# Patient Record
Sex: FEMALE | Race: WHITE | Employment: OTHER | URBAN - METROPOLITAN AREA
[De-identification: names, ages, dates, MRNs, and addresses within clinical notes are randomized per-mention and may not be internally consistent; named-entity substitution may affect disease eponyms.]

---

## 2023-10-24 ENCOUNTER — HOSPITAL ENCOUNTER (OUTPATIENT)
Facility: HOSPITAL | Age: 78
Setting detail: OBSERVATION
Discharge: HOME | End: 2023-10-25
Attending: EMERGENCY MEDICINE | Admitting: HOSPITALIST
Payer: MEDICARE

## 2023-10-24 DIAGNOSIS — R11.10 INTRACTABLE VOMITING: Primary | ICD-10-CM

## 2023-10-24 DIAGNOSIS — E86.0 DEHYDRATION: ICD-10-CM

## 2023-10-24 PROBLEM — R73.9 HYPERGLYCEMIA: Status: ACTIVE | Noted: 2023-10-24

## 2023-10-24 PROBLEM — E87.0 HYPERNATREMIA: Status: ACTIVE | Noted: 2023-10-24

## 2023-10-24 PROBLEM — R79.89 AZOTEMIA: Status: ACTIVE | Noted: 2023-10-24

## 2023-10-24 LAB
ALBUMIN SERPL-MCNC: 3.8 G/DL (ref 3.4–5)
ALBUMIN/GLOB SERPL: 1.1 {RATIO} (ref 1–2)
ALP LIVER SERPL-CCNC: 70 U/L
ALT SERPL-CCNC: 19 U/L
ANION GAP SERPL CALC-SCNC: 7 MMOL/L (ref 0–18)
AST SERPL-CCNC: 14 U/L (ref 15–37)
BASOPHILS # BLD AUTO: 0.03 X10(3) UL (ref 0–0.2)
BASOPHILS NFR BLD AUTO: 0.4 %
BILIRUB SERPL-MCNC: 0.5 MG/DL (ref 0.1–2)
BILIRUB UR QL: NEGATIVE
BUN BLD-MCNC: 17 MG/DL (ref 7–18)
BUN/CREAT SERPL: 29.8 (ref 10–20)
CALCIUM BLD-MCNC: 9.5 MG/DL (ref 8.5–10.1)
CHLORIDE SERPL-SCNC: 114 MMOL/L (ref 98–112)
CO2 SERPL-SCNC: 26 MMOL/L (ref 21–32)
COLOR UR: YELLOW
CREAT BLD-MCNC: 0.57 MG/DL
DEPRECATED RDW RBC AUTO: 44.7 FL (ref 35.1–46.3)
EGFRCR SERPLBLD CKD-EPI 2021: 94 ML/MIN/1.73M2 (ref 60–?)
EOSINOPHIL # BLD AUTO: 0.05 X10(3) UL (ref 0–0.7)
EOSINOPHIL NFR BLD AUTO: 0.6 %
ERYTHROCYTE [DISTWIDTH] IN BLOOD BY AUTOMATED COUNT: 12.2 % (ref 11–15)
FLUAV + FLUBV RNA SPEC NAA+PROBE: NEGATIVE
FLUAV + FLUBV RNA SPEC NAA+PROBE: NEGATIVE
GLOBULIN PLAS-MCNC: 3.6 G/DL (ref 2.8–4.4)
GLUCOSE BLD-MCNC: 113 MG/DL (ref 70–99)
GLUCOSE UR-MCNC: NORMAL MG/DL
HCT VFR BLD AUTO: 42.5 %
HGB BLD-MCNC: 14 G/DL
HGB UR QL STRIP.AUTO: NEGATIVE
IMM GRANULOCYTES # BLD AUTO: 0.02 X10(3) UL (ref 0–1)
IMM GRANULOCYTES NFR BLD: 0.2 %
KETONES UR-MCNC: 80 MG/DL
LEUKOCYTE ESTERASE UR QL STRIP.AUTO: 250
LYMPHOCYTES # BLD AUTO: 1.31 X10(3) UL (ref 1–4)
LYMPHOCYTES NFR BLD AUTO: 16.2 %
MCH RBC QN AUTO: 32.3 PG (ref 26–34)
MCHC RBC AUTO-ENTMCNC: 32.9 G/DL (ref 31–37)
MCV RBC AUTO: 98.2 FL
MONOCYTES # BLD AUTO: 0.61 X10(3) UL (ref 0.1–1)
MONOCYTES NFR BLD AUTO: 7.6 %
NEUTROPHILS # BLD AUTO: 6.05 X10 (3) UL (ref 1.5–7.7)
NEUTROPHILS # BLD AUTO: 6.05 X10(3) UL (ref 1.5–7.7)
NEUTROPHILS NFR BLD AUTO: 75 %
NITRITE UR QL STRIP.AUTO: NEGATIVE
OSMOLALITY SERPL CALC.SUM OF ELEC: 306 MOSM/KG (ref 275–295)
PH UR: 6 [PH] (ref 5–8)
PLATELET # BLD AUTO: 219 10(3)UL (ref 150–450)
POTASSIUM SERPL-SCNC: 3.6 MMOL/L (ref 3.5–5.1)
PROT SERPL-MCNC: 7.4 G/DL (ref 6.4–8.2)
PROT UR-MCNC: 50 MG/DL
RBC # BLD AUTO: 4.33 X10(6)UL
RSV RNA SPEC NAA+PROBE: NEGATIVE
SARS-COV-2 RNA RESP QL NAA+PROBE: NOT DETECTED
SODIUM SERPL-SCNC: 147 MMOL/L (ref 136–145)
SP GR UR STRIP: 1.02 (ref 1–1.03)
TROPONIN I HIGH SENSITIVITY: 6 NG/L
UROBILINOGEN UR STRIP-ACNC: NORMAL
WBC # BLD AUTO: 8.1 X10(3) UL (ref 4–11)

## 2023-10-24 PROCEDURE — 96375 TX/PRO/DX INJ NEW DRUG ADDON: CPT

## 2023-10-24 PROCEDURE — 0241U SARS-COV-2/FLU A AND B/RSV BY PCR (GENEXPERT): CPT | Performed by: EMERGENCY MEDICINE

## 2023-10-24 PROCEDURE — 93005 ELECTROCARDIOGRAM TRACING: CPT

## 2023-10-24 PROCEDURE — 99285 EMERGENCY DEPT VISIT HI MDM: CPT

## 2023-10-24 PROCEDURE — 93010 ELECTROCARDIOGRAM REPORT: CPT

## 2023-10-24 PROCEDURE — 96361 HYDRATE IV INFUSION ADD-ON: CPT

## 2023-10-24 PROCEDURE — 81001 URINALYSIS AUTO W/SCOPE: CPT | Performed by: EMERGENCY MEDICINE

## 2023-10-24 PROCEDURE — 84484 ASSAY OF TROPONIN QUANT: CPT | Performed by: EMERGENCY MEDICINE

## 2023-10-24 PROCEDURE — 96374 THER/PROPH/DIAG INJ IV PUSH: CPT

## 2023-10-24 PROCEDURE — 85025 COMPLETE CBC W/AUTO DIFF WBC: CPT | Performed by: EMERGENCY MEDICINE

## 2023-10-24 PROCEDURE — 80053 COMPREHEN METABOLIC PANEL: CPT | Performed by: EMERGENCY MEDICINE

## 2023-10-24 RX ORDER — SODIUM CHLORIDE 9 MG/ML
INJECTION, SOLUTION INTRAVENOUS CONTINUOUS
Status: CANCELLED | OUTPATIENT
Start: 2023-10-24

## 2023-10-24 RX ORDER — ROSUVASTATIN CALCIUM 10 MG/1
10 TABLET, COATED ORAL NIGHTLY
COMMUNITY

## 2023-10-24 RX ORDER — HYDRALAZINE HYDROCHLORIDE 20 MG/ML
10 INJECTION INTRAMUSCULAR; INTRAVENOUS EVERY 6 HOURS PRN
Status: DISCONTINUED | OUTPATIENT
Start: 2023-10-24 | End: 2023-10-25

## 2023-10-24 RX ORDER — METOCLOPRAMIDE HYDROCHLORIDE 5 MG/ML
10 INJECTION INTRAMUSCULAR; INTRAVENOUS EVERY 8 HOURS PRN
Status: DISCONTINUED | OUTPATIENT
Start: 2023-10-24 | End: 2023-10-25

## 2023-10-24 RX ORDER — ENOXAPARIN SODIUM 100 MG/ML
40 INJECTION SUBCUTANEOUS DAILY
Status: DISCONTINUED | OUTPATIENT
Start: 2023-10-25 | End: 2023-10-25

## 2023-10-24 RX ORDER — ONDANSETRON 2 MG/ML
4 INJECTION INTRAMUSCULAR; INTRAVENOUS EVERY 6 HOURS PRN
Status: DISCONTINUED | OUTPATIENT
Start: 2023-10-24 | End: 2023-10-25

## 2023-10-24 RX ORDER — BENAZEPRIL HYDROCHLORIDE 5 MG/1
5 TABLET, FILM COATED ORAL DAILY
COMMUNITY

## 2023-10-24 RX ORDER — RALOXIFENE HYDROCHLORIDE 60 MG/1
60 TABLET, FILM COATED ORAL DAILY
COMMUNITY

## 2023-10-24 RX ORDER — ONDANSETRON 2 MG/ML
4 INJECTION INTRAMUSCULAR; INTRAVENOUS ONCE
Status: COMPLETED | OUTPATIENT
Start: 2023-10-24 | End: 2023-10-24

## 2023-10-24 RX ORDER — SODIUM CHLORIDE 450 MG/100ML
INJECTION, SOLUTION INTRAVENOUS CONTINUOUS
Status: DISCONTINUED | OUTPATIENT
Start: 2023-10-24 | End: 2023-10-25

## 2023-10-24 NOTE — ED QUICK NOTES
Pt to ED with c/o nausea and vomiting that started approximately 1830 last night. Pt denies abdominal pain or diarrhea. Pt denies fever. Pt denies fall or head trauma. Pt denies dizziness, chest pain, or dyspnea. No respiratory distress noted. Abdomen soft and non distended. Pt denies cough. Pt denies dysuria or hematuria. Pt is alert and oriented x4. Pt skin parameters WNL. Pt ambulating by self with steady gait. IV established to RAC #[id number] infusing per order. Awaiting labs. Will continue to monitor.

## 2023-10-25 ENCOUNTER — ANESTHESIA EVENT (OUTPATIENT)
Dept: ENDOSCOPY | Facility: HOSPITAL | Age: 78
End: 2023-10-25
Payer: MEDICARE

## 2023-10-25 ENCOUNTER — ANESTHESIA (OUTPATIENT)
Dept: ENDOSCOPY | Facility: HOSPITAL | Age: 78
End: 2023-10-25
Payer: MEDICARE

## 2023-10-25 VITALS
HEART RATE: 84 BPM | WEIGHT: 150 LBS | OXYGEN SATURATION: 95 % | RESPIRATION RATE: 20 BRPM | SYSTOLIC BLOOD PRESSURE: 104 MMHG | HEIGHT: 62.5 IN | TEMPERATURE: 98 F | BODY MASS INDEX: 26.91 KG/M2 | DIASTOLIC BLOOD PRESSURE: 55 MMHG

## 2023-10-25 LAB
ANION GAP SERPL CALC-SCNC: 7 MMOL/L (ref 0–18)
ATRIAL RATE: 87 BPM
BUN BLD-MCNC: 19 MG/DL (ref 7–18)
BUN/CREAT SERPL: 43.2 (ref 10–20)
CALCIUM BLD-MCNC: 8.3 MG/DL (ref 8.5–10.1)
CHLORIDE SERPL-SCNC: 116 MMOL/L (ref 98–112)
CO2 SERPL-SCNC: 24 MMOL/L (ref 21–32)
CREAT BLD-MCNC: 0.44 MG/DL
DEPRECATED RDW RBC AUTO: 45.9 FL (ref 35.1–46.3)
EGFRCR SERPLBLD CKD-EPI 2021: 100 ML/MIN/1.73M2 (ref 60–?)
ERYTHROCYTE [DISTWIDTH] IN BLOOD BY AUTOMATED COUNT: 12.3 % (ref 11–15)
GLUCOSE BLD-MCNC: 99 MG/DL (ref 70–99)
HCT VFR BLD AUTO: 37.4 %
HGB BLD-MCNC: 12.1 G/DL
MCH RBC QN AUTO: 32.7 PG (ref 26–34)
MCHC RBC AUTO-ENTMCNC: 32.4 G/DL (ref 31–37)
MCV RBC AUTO: 101.1 FL
OSMOLALITY SERPL CALC.SUM OF ELEC: 306 MOSM/KG (ref 275–295)
P AXIS: 51 DEGREES
P-R INTERVAL: 168 MS
PLATELET # BLD AUTO: 180 10(3)UL (ref 150–450)
POTASSIUM SERPL-SCNC: 3.3 MMOL/L (ref 3.5–5.1)
Q-T INTERVAL: 388 MS
QRS DURATION: 90 MS
QTC CALCULATION (BEZET): 466 MS
R AXIS: -39 DEGREES
RBC # BLD AUTO: 3.7 X10(6)UL
SODIUM SERPL-SCNC: 147 MMOL/L (ref 136–145)
T AXIS: 34 DEGREES
VENTRICULAR RATE: 87 BPM
WBC # BLD AUTO: 11.2 X10(3) UL (ref 4–11)

## 2023-10-25 PROCEDURE — 0DB98ZX EXCISION OF DUODENUM, VIA NATURAL OR ARTIFICIAL OPENING ENDOSCOPIC, DIAGNOSTIC: ICD-10-PCS | Performed by: INTERNAL MEDICINE

## 2023-10-25 PROCEDURE — 88312 SPECIAL STAINS GROUP 1: CPT | Performed by: INTERNAL MEDICINE

## 2023-10-25 PROCEDURE — 80048 BASIC METABOLIC PNL TOTAL CA: CPT | Performed by: STUDENT IN AN ORGANIZED HEALTH CARE EDUCATION/TRAINING PROGRAM

## 2023-10-25 PROCEDURE — 88305 TISSUE EXAM BY PATHOLOGIST: CPT | Performed by: INTERNAL MEDICINE

## 2023-10-25 PROCEDURE — C9113 INJ PANTOPRAZOLE SODIUM, VIA: HCPCS | Performed by: HOSPITALIST

## 2023-10-25 PROCEDURE — 85027 COMPLETE CBC AUTOMATED: CPT | Performed by: STUDENT IN AN ORGANIZED HEALTH CARE EDUCATION/TRAINING PROGRAM

## 2023-10-25 RX ORDER — SUCRALFATE ORAL 1 G/10ML
1 SUSPENSION ORAL
Qty: 200 ML | Refills: 0 | Status: SHIPPED | OUTPATIENT
Start: 2023-10-25 | End: 2023-10-30

## 2023-10-25 RX ORDER — NALOXONE HYDROCHLORIDE 0.4 MG/ML
80 INJECTION, SOLUTION INTRAMUSCULAR; INTRAVENOUS; SUBCUTANEOUS AS NEEDED
OUTPATIENT
Start: 2023-10-25 | End: 2023-10-25

## 2023-10-25 RX ORDER — SUCRALFATE ORAL 1 G/10ML
1 SUSPENSION ORAL
Qty: 560 ML | Refills: 0 | Status: SHIPPED | OUTPATIENT
Start: 2023-10-25 | End: 2023-10-25

## 2023-10-25 RX ORDER — MORPHINE SULFATE 4 MG/ML
2 INJECTION, SOLUTION INTRAMUSCULAR; INTRAVENOUS EVERY 10 MIN PRN
OUTPATIENT
Start: 2023-10-25

## 2023-10-25 RX ORDER — SUCRALFATE ORAL 1 G/10ML
1 SUSPENSION ORAL
Status: DISCONTINUED | OUTPATIENT
Start: 2023-10-25 | End: 2023-10-25

## 2023-10-25 RX ORDER — MORPHINE SULFATE 10 MG/ML
6 INJECTION, SOLUTION INTRAMUSCULAR; INTRAVENOUS EVERY 10 MIN PRN
OUTPATIENT
Start: 2023-10-25

## 2023-10-25 RX ORDER — HYDROMORPHONE HYDROCHLORIDE 1 MG/ML
0.4 INJECTION, SOLUTION INTRAMUSCULAR; INTRAVENOUS; SUBCUTANEOUS EVERY 5 MIN PRN
OUTPATIENT
Start: 2023-10-25

## 2023-10-25 RX ORDER — MORPHINE SULFATE 4 MG/ML
4 INJECTION, SOLUTION INTRAMUSCULAR; INTRAVENOUS EVERY 10 MIN PRN
OUTPATIENT
Start: 2023-10-25

## 2023-10-25 RX ORDER — SODIUM CHLORIDE, SODIUM LACTATE, POTASSIUM CHLORIDE, CALCIUM CHLORIDE 600; 310; 30; 20 MG/100ML; MG/100ML; MG/100ML; MG/100ML
INJECTION, SOLUTION INTRAVENOUS CONTINUOUS
OUTPATIENT
Start: 2023-10-25

## 2023-10-25 RX ORDER — PANTOPRAZOLE SODIUM 40 MG/1
40 TABLET, DELAYED RELEASE ORAL
Qty: 60 TABLET | Refills: 0 | Status: SHIPPED | OUTPATIENT
Start: 2023-10-26 | End: 2023-12-25

## 2023-10-25 RX ORDER — HYDROMORPHONE HYDROCHLORIDE 1 MG/ML
0.2 INJECTION, SOLUTION INTRAMUSCULAR; INTRAVENOUS; SUBCUTANEOUS EVERY 5 MIN PRN
OUTPATIENT
Start: 2023-10-25

## 2023-10-25 RX ORDER — SODIUM CHLORIDE, SODIUM LACTATE, POTASSIUM CHLORIDE, CALCIUM CHLORIDE 600; 310; 30; 20 MG/100ML; MG/100ML; MG/100ML; MG/100ML
INJECTION, SOLUTION INTRAVENOUS CONTINUOUS PRN
Status: DISCONTINUED | OUTPATIENT
Start: 2023-10-25 | End: 2023-10-25 | Stop reason: SURG

## 2023-10-25 RX ORDER — HYDROMORPHONE HYDROCHLORIDE 1 MG/ML
0.6 INJECTION, SOLUTION INTRAMUSCULAR; INTRAVENOUS; SUBCUTANEOUS EVERY 5 MIN PRN
OUTPATIENT
Start: 2023-10-25

## 2023-10-25 RX ORDER — PANTOPRAZOLE SODIUM 40 MG/1
40 TABLET, DELAYED RELEASE ORAL
Status: DISCONTINUED | OUTPATIENT
Start: 2023-10-26 | End: 2023-10-25

## 2023-10-25 RX ADMIN — SODIUM CHLORIDE, SODIUM LACTATE, POTASSIUM CHLORIDE, CALCIUM CHLORIDE: 600; 310; 30; 20 INJECTION, SOLUTION INTRAVENOUS at 13:49:00

## 2023-10-25 RX ADMIN — SODIUM CHLORIDE, SODIUM LACTATE, POTASSIUM CHLORIDE, CALCIUM CHLORIDE: 600; 310; 30; 20 INJECTION, SOLUTION INTRAVENOUS at 13:26:00

## 2023-10-25 NOTE — H&P
The patient was seen by  SELWYN Jauregui and me  today and I reviewed the H&P today. The patient was examined and no significant changes have occurred in the patient's condition since the H&P was performed. I discussed with the patient and/or legal representative the potential benefits, risks and side effects of this procedure, including the risks of bleeding, perforation, sedation, missed lesions, possible need for repeat endoscopy, the likelihood of the patient achieving goals; and potential problems that might occur during recuperation. I discussed reasonable alternatives to the procedure, including risks, benefits and side effects related to the alternatives and risks related to not receiving this procedure. We will proceed with procedure as planned.

## 2023-10-25 NOTE — ANESTHESIA POSTPROCEDURE EVALUATION
Patient: Ed Mole    Procedure Summary       Date: 10/25/23 Room / Location: [facility] ENDOSCOPY 01 / [facility] ENDOSCOPY    Anesthesia Start: 5013 Anesthesia Stop: 1344    Procedure: ESOPHAGOGASTRODUODENOSCOPY (EGD) Diagnosis: (food bolus, schatzkis ring, hiatal hernia)    Surgeons: Luis Candelaria MD Anesthesiologist: Clark Dimas MD    Anesthesia Type: MAC ASA Status: 2            Anesthesia Type: MAC    Vitals Value Taken Time   BP 92/61 10/25/23 1400   Temp 36.8 10/25/23 1405   Pulse 80 10/25/23 1404   Resp 29 10/25/23 1404   SpO2 93 % 10/25/23 1404   Vitals shown include unfiled device data.     [facility] AN Post Evaluation:   Patient Evaluated in PACU  Patient Participation: waiting for patient participation  Level of Consciousness: sleepy but conscious  Pain Score: 0  Pain Management: adequate  Airway Patency:patent  Yes    Cardiovascular Status: acceptable and hemodynamically stable  Respiratory Status: acceptable, spontaneous ventilation and nonlabored ventilation  Postoperative Hydration euvolemic      Dedrick Xiomara Jenkins MD  10/25/2023 2:05 PM

## 2023-10-25 NOTE — CM/SW NOTE
10/25/23 1500   Discharge disposition   Expected discharge disposition Home or Self   Discharge transportation Private car     Gabino GARCIAN RN [address] [place] [address]   RN Case Manager  [phone]

## 2023-10-25 NOTE — PLAN OF CARE
Pt tolerated soft diet with no issues, got ok to discharge from Dr. Elizabeth Peres. Saline lock removed. Patient given discharge instructions including when to take next doses, pt to make follow up appointments with her MD's and  her new prescriptions at her pharmacy. All questions answered. Patient discharged in good condition.   Problem: Patient Centered Care  Goal: Patient preferences are identified and integrated in the patient's plan of care  Description: Interventions:  - What would you like us to know as we care for you?   - Provide timely, complete, and accurate information to patient/family  - Incorporate patient and family knowledge, values, beliefs, and cultural backgrounds into the planning and delivery of care  - Encourage patient/family to participate in care and decision-making at the level they choose  - Honor patient and family perspectives and choices  Outcome: Adequate for Discharge     Problem: Patient/Family Goals  Goal: Patient/Family Long Term Goal  Description: Patient's Long Term Goal:     Interventions:  -   - See additional Care Plan goals for specific interventions  Outcome: Adequate for Discharge  Goal: Patient/Family Short Term Goal  Description: Patient's Short Term Goal:     Interventions:   -   - See additional Care Plan goals for specific interventions  Outcome: Adequate for Discharge     Problem: GASTROINTESTINAL - ADULT  Goal: Minimal or absence of nausea and vomiting  Description: INTERVENTIONS:  - Maintain adequate hydration with IV or PO as ordered and tolerated  - Nasogastric tube to low intermittent suction as ordered  - Evaluate effectiveness of ordered antiemetic medications  - Provide nonpharmacologic comfort measures as appropriate  - Advance diet as tolerated, if ordered  - Obtain nutritional consult as needed  - Evaluate fluid balance  Outcome: Adequate for Discharge  Goal: Maintains or returns to baseline bowel function  Description: INTERVENTIONS:  - Assess bowel function  - Maintain adequate hydration with IV or PO as ordered and tolerated  - Evaluate effectiveness of GI medications  - Encourage mobilization and activity  - Obtain nutritional consult as needed  - Establish a toileting routine/schedule  - Consider collaborating with pharmacy to review patient's medication profile  Outcome: Adequate for Discharge  Goal: Maintains adequate nutritional intake (undernourished)  Description: INTERVENTIONS:  - Monitor percentage of each meal consumed  - Identify factors contributing to decreased intake, treat as appropriate  - Assist with meals as needed  - Monitor I&O, WT and lab values  - Obtain nutritional consult as needed  - Optimize oral hygiene and moisture  - Encourage food from home; allow for food preferences  - Enhance eating environment  Outcome: Adequate for Discharge  Goal: Achieves appropriate nutritional intake (bariatric)  Description: INTERVENTIONS:  - Monitor for over-consumption  - Identify factors contributing to increased intake, treat as appropriate  - Monitor I&O, WT and lab values  - Obtain nutritional consult as needed  - Evaluate psychosocial factors contributing to over-consumption  Outcome: Adequate for Discharge     Problem: METABOLIC/FLUID AND ELECTROLYTES - ADULT  Goal: Electrolytes maintained within normal limits  Description: INTERVENTIONS:  - Monitor labs and rhythm and assess patient for signs and symptoms of electrolyte imbalances  - Administer electrolyte replacement as ordered  - Monitor response to electrolyte replacements, including rhythm and repeat lab results as appropriate  - Fluid restriction as ordered  - Instruct patient on fluid and nutrition restrictions as appropriate  Outcome: Adequate for Discharge  Goal: Hemodynamic stability and optimal renal function maintained  Description: INTERVENTIONS:  - Monitor labs and assess for signs and symptoms of volume excess or deficit  - Monitor intake, output and patient weight  - Monitor urine specific gravity, serum osmolarity and serum sodium as indicated or ordered  - Monitor response to interventions for patient's volume status, including labs, urine output, blood pressure (other measures as available)  - Encourage oral intake as appropriate  - Instruct patient on fluid and nutrition restrictions as appropriate  Outcome: Adequate for Discharge

## 2023-10-25 NOTE — OPERATIVE REPORT
EGD Operative Report    Chelsy Larry Patient Status:  Observation    10/10/1945 MRN [id number]   Location [facility] ENDOSCOPY LAB SUITES Attending Hilda Leyva MD   Hosp Day #   0 PCP Parviz Armijo MD       Pre-op Diagnosis: dysphagia    Post-Op Diagnosis: Hiatal hernia and Schatski's ring, esophagitis    Pre-procedure: The patient was assessed in the procedure room immediately before induction of sedation which included, at a minimum, vital signs, NPO status, and airway assessment. The patient was deemed and appropriate candidate for procedural sedation. Informed Consent: Informed consent for both the procedure and sedation were obtained from the patient. The risks, benefits and alternatives to the procedure including potentially life-threatening complications of sedation, bleeding, perforation, missed lesions or need for repeat endoscopy were reviewed along with the possible need for hospitalization, surgical management, transfusion of blood products or repeat endoscopy should one of these complications arise. The patient and/or POA voiced their understanding and was agreeable to proceed. Sedation Type: MAC-Patient received sedation with monitored anesthesia provided by an anesthesiologist    Procedure Description: The patient was placed in the left lateral decubitus position. A bite block was placed in the patient's mouth and the endoscope was passed through the mouth under direct vision and advanced to the second portion of the duodenum. The endoscope was then withdrawn to examine the duodenal bulb, pylorus and gastric antrum, body, incisura and fundus and then retroflexed to  to examine the GE junction and cardia. The upper endoscopy was performed without difficulty, the patient tolerated the procedure well with no immediate complications. The patient was transferred to the recovery area in stable condition. Findings: Upper middle thirds of the esophagus were grossly normal biopsies were taken to evaluate for eosinophilic esophagitis given patient's complaints. In the lower third of the esophagus, approximately 38 cm from the incisors, a moderate Schatzki's ring was noted in the setting of grade 2 esophagitis. A food bolus was noted in the distal third of the esophagus and easily pushed through into the stomach with no resistance. Biopsies were taken of the Schatzki's ring for histology and the ring was broken and no dilation was performed due to existing mucosal irritation and oozing. Remainder of the stomach, duodenal bulb and second portion of the duodenum were otherwise unremarkable. Impression: Esophagitis (inflamation of the esophagus lining), Hiatal hernia (stomach slides up into chest through diaphragm), and Schatski's ring  Recommendations: Discharge patient when discharge criteria are met., Await pathology results, Further recommendations will be made after pathology results are available. Discharge: The patient was given an after visit summary detailing the procedure, findings, recommendations and follow up plans.     Spring Cary MD  10/25/2023  1:52 PM

## 2023-10-25 NOTE — DISCHARGE SUMMARY
General Medicine Discharge Summary     Patient ID:  Maureen Arnold  70year old  10/10/1945    Admit date: 10/24/2023    Discharge date and time: 10/25/2023    Attending Physician: Gasper Lesch, MD     Consults: IP CONSULT TO HOSPITALIST  IP CONSULT TO GASTROENTEROLOGY    Primary Care Physician: Rebecca Shukla MD     Reason for admission: dehydration    Risk For Readmission: high    Discharge Diagnoses:  Food bolus, Schatzki's ring, esophagitis   Dehydration [E86.0]  Intractable vomiting [R11.10]  See Additional Discharge Diagnoses in Hospital Course    Discharged Condition: good    Follow-up with labs/images appointments: Follow-up with PCP in 1 week, follow-up with Dr. Sosa Piña in 4 weeks for repeat EGD    Exam  Gen: No acute distress  Pulm: Lungs clear, normal respiratory effort  CV: Heart with regular rate and rhythm  Abd: Abdomen soft,   EXT: no edema     HPI:   History of Present Illness: Ms. Donovan Foster is a 70year old female with PMH sig for HTN, HLD, who presents with vomiting, dysphagia. Patient states that on Monday night while eating chicken wings at a restaurant suddenly began having trouble swallowing, had to go the bathroom and then had a few episodes of NB NB vomiting. She says since that time she has been unable to keep anything down, solids or liquids will come right back up. She denies nausea, no diarrhea, no fevers or chills, no pain, or pressure, no other complaints. She has noted progressive difficulty swallowing over the last few months, and has been cutting up her food smaller, denies issues with GERD, but has had similar episodes of vomiting over the last year but nothing that has lasted this long. She denies chest pain or sob, no headaches or vision changes, she recently had a polyp removed from her nose but that has healed well.        Hospital Course:   Ms. Donovan Foster is a 70year old female with PMH sig for HTN, HLD, who presents with vomiting, dysphagia, secondary to food bolus, EGD done on 10/25 showing moderate Schatzki's ring, grade 2 esophagitis, with food bolus noted in distal third of the esophagus, this was pushed through into the stomach without resistance, biopsies were taken of the Schatzki's ring, and the ring was broken, dilation was not performed due to significant mucosal irritation. Diet was advanced and patient tolerated this well, cleared by GI for discharge home, vital signs stable no chest pain shortness of breath nausea vomiting prior to discharge. Plan to discharge home on PPI, and Carafate, with plans for outpatient EGD in 4 weeks for possible dilation at that time. Discussed with patient and  on discharge. Follow-up with PCP in 1 week to ensure symptoms have improved     Dysphagia / vomiting  Poss food bolus vs esophageal stricture  - secondary to food bolus, EGD done on 10/25 showing moderate Schatzki's ring, grade 2 esophagitis, with food bolus noted in distal third of the esophagus, this was pushed through into the stomach without resistance, biopsies were taken of the Schatzki's ring, and the ring was broken, dilation was not performed due to significant mucosal irritation. Diet was advanced and patient tolerated this well  -Plan to discharge home on PPI, and Carafate, with plans for outpatient EGD in 4 weeks for possible dilation at that time. Discussed with patient and  on discharge. Hypernatremia / hypokalemia  - secondary to above, did receive half-normal saline while in hospital  - Advance p.o. intake  - repleted potassium     HTN  -Resume home meds     HLD  -Resume home meds    Operative Procedures: Procedure(s) (LRB):  ESOPHAGOGASTRODUODENOSCOPY (EGD) (N/A)     Imaging: No results found.       Disposition: home    Activity: activity as tolerated  Diet: Advance diet per GI  Wound Care: as directed  Code Status: Full Code  \    Home Medication Changes:      Med list     Medication List        START taking these medications      pantoprazole 40 MG Tbec  Commonly known as: Protonix  Take 1 tablet (40 mg total) by mouth every morning before breakfast.  Start taking on: October 26, 2023     sucralfate 1 GM/10ML Susp  Commonly known as: Carafate  Take 10 mL (1 g total) by mouth 4 (four) times daily before meals and nightly (2200) for 5 days. CONTINUE taking these medications      Benazepril HCl 5 MG Tabs  Commonly known as: LOTENSIN     raloxifene 60 MG Tabs  Commonly known as: Evista     rosuvastatin 10 MG Tabs  Commonly known as: Crestor               Where to Get Your Medications        These medications were sent to [facility] [address], [facility], [address] [place] [address]      Phone: [phone]   pantoprazole 40 MG Tbec  sucralfate 1 GM/10ML Susp         FU   Follow-up Information       Katheryn Sifuentes MD. Schedule an appointment as soon as possible for a visit in 4 week(s). Specialty: GASTROENTEROLOGY  Contact information:  [address] [facility] [address] [phone]               Inderjit Vallejo MD. Schedule an appointment as soon as possible for a visit in 1 week(s). Specialty: Internal Medicine  Contact information:  [facility] [address] [address]  [phone]                             BP instructions: Other Discharge Instructions:         POST-UPPER ENDOSCOPY (EGD) DISCHARGE INSTRUCTIONS      PROCEDURE PERFORMED: EGD with biopsies    ENDOSCOPIST: Tana Connor MD    FINDINGS: Esophagitis (inflamation of the esophagus lining), Hiatal hernia (stomach slides up into chest through diaphragm), and Schatski's ring    MEDICATIONS: You may resume all other medications today    DIET: You may resume your regular diet today. BIOPSIES: Biopsies were taken. They will be sent to pathology and results will be available in 7-10 days.     X-RAYS: No X-rays/Labs were ordered today        Activity for remainder of today:  REST TODAY  DO NOT drive or operate heavy machinery  DO NOT drink any alcoholic beverages  DO NOT sign any legal documents or make any important decisions    After your procedure(s): It is not unusual to feel bloated or gassy . Passing gas and belching is encouraged. Lying on your left side with your knees flexed may relieve the discomfort. A hot pack to the abdomen may also help. If you had an upper endoscopy (EGD), you may experience a slight sore throat which will subside. Throat lozenges or salt water gargle can be used. FOLLOW-UP:  Contact the office at [phone] if a follow-up appointment is needed or if you develop any of the following:    Severe abdominal pain/discomfort   Excessive bleeding  Black tarry stool  Difficulty breathing/swallowing  Persistent nausea/vomiting    Fever above 100 degrees or chills      Carafate for 5 days. Follow up with Dr. Crys Guthrie in 4 weeks for repeat EGD              I reconciled current and discharge medications on day of discharge, discussed changes with patient and noted changes above.        Total Time Coordinating Care: Greater than 30 minutes    Patient had opportunity to ask questions and state understand and agree with therapeutic plan as outlined    Thank You,    Federica Mosqueda MD   Hospitalist with [facility]

## 2023-10-25 NOTE — ED QUICK NOTES
Orders for admission, patient is aware of plan and ready to go upstairs. Any questions, please call ED RN Rexanne Hodgkins at extension [phone].      Patient Covid vaccination status: Fully vaccinated     COVID Test Ordered in ED: SARS-CoV-2/Flu A and B/RSV by PCR (GeneXpert)    COVID Suspicion at Admission: N/A    Running Infusions:  None    Mental Status/LOC at time of transport: A&Ox4    Other pertinent information:   CIWA score: N/A   NIH score:  N/A

## 2023-10-25 NOTE — DISCHARGE INSTRUCTIONS
POST-UPPER ENDOSCOPY (EGD) DISCHARGE INSTRUCTIONS      PROCEDURE PERFORMED: EGD with biopsies    ENDOSCOPIST: Johanny Patino MD    FINDINGS: Esophagitis (inflamation of the esophagus lining), Hiatal hernia (stomach slides up into chest through diaphragm), and Schatski's ring    MEDICATIONS: You may resume all other medications today    DIET: You may resume your regular diet today. BIOPSIES: Biopsies were taken. They will be sent to pathology and results will be available in 7-10 days. X-RAYS: No X-rays/Labs were ordered today        Activity for remainder of today:  REST TODAY  DO NOT drive or operate heavy machinery  DO NOT drink any alcoholic beverages  DO NOT sign any legal documents or make any important decisions    After your procedure(s): It is not unusual to feel bloated or gassy . Passing gas and belching is encouraged. Lying on your left side with your knees flexed may relieve the discomfort. A hot pack to the abdomen may also help. If you had an upper endoscopy (EGD), you may experience a slight sore throat which will subside. Throat lozenges or salt water gargle can be used. FOLLOW-UP:  Contact the office at [phone] if a follow-up appointment is needed or if you develop any of the following:    Severe abdominal pain/discomfort   Excessive bleeding  Black tarry stool  Difficulty breathing/swallowing  Persistent nausea/vomiting    Fever above 100 degrees or chills      Carafate for 5 days.     Follow up with Dr. Eden Dowd in 4 weeks for repeat EGD

## 2023-10-25 NOTE — PLAN OF CARE
Patient was admitted due to inability to keep food or water down,vomiting with no pain, nausea or diarrhea. Ivf infusing, waiting on GI to see, discussions of possible scope today. Strict Npo since admit. Patient ambulatory with standby assistance. Problem: Patient Centered Care  Goal: Patient preferences are identified and integrated in the patient's plan of care  Description: Interventions:  - What would you like us to know as we care for you? Had a nose polyp removed last monday  - Provide timely, complete, and accurate information to patient/family  - Incorporate patient and family knowledge, values, beliefs, and cultural backgrounds into the planning and delivery of care  - Encourage patient/family to participate in care and decision-making at the level they choose  - Honor patient and family perspectives and choices  Outcome: Not Progressing     Problem: Patient/Family Goals  Goal: Patient/Family Long Term Goal  Description: Patient's Long Term Goal: return home with no further hospital stays    Interventions:  - follow up with doctors  -take medications as prescribed.   - See additional Care Plan goals for specific interventions  Outcome: Not Progressing  Goal: Patient/Family Short Term Goal  Description: Patient's Short Term Goal: find out cause of vomiting    Interventions:   - possible scope  -ct abd  - See additional Care Plan goals for specific interventions  Outcome: Not Progressing     Problem: GASTROINTESTINAL - ADULT  Goal: Minimal or absence of nausea and vomiting  Description: INTERVENTIONS:  - Maintain adequate hydration with IV or PO as ordered and tolerated  - Nasogastric tube to low intermittent suction as ordered  - Evaluate effectiveness of ordered antiemetic medications  - Provide nonpharmacologic comfort measures as appropriate  - Advance diet as tolerated, if ordered  - Obtain nutritional consult as needed  - Evaluate fluid balance  Outcome: Not Progressing  Goal: Maintains or returns to baseline bowel function  Description: INTERVENTIONS:  - Assess bowel function  - Maintain adequate hydration with IV or PO as ordered and tolerated  - Evaluate effectiveness of GI medications  - Encourage mobilization and activity  - Obtain nutritional consult as needed  - Establish a toileting routine/schedule  - Consider collaborating with pharmacy to review patient's medication profile  Outcome: Not Progressing  Goal: Maintains adequate nutritional intake (undernourished)  Description: INTERVENTIONS:  - Monitor percentage of each meal consumed  - Identify factors contributing to decreased intake, treat as appropriate  - Assist with meals as needed  - Monitor I&O, WT and lab values  - Obtain nutritional consult as needed  - Optimize oral hygiene and moisture  - Encourage food from home; allow for food preferences  - Enhance eating environment  Outcome: Not Progressing  Goal: Achieves appropriate nutritional intake (bariatric)  Description: INTERVENTIONS:  - Monitor for over-consumption  - Identify factors contributing to increased intake, treat as appropriate  - Monitor I&O, WT and lab values  - Obtain nutritional consult as needed  - Evaluate psychosocial factors contributing to over-consumption  Outcome: Not Progressing     Problem: METABOLIC/FLUID AND ELECTROLYTES - ADULT  Goal: Electrolytes maintained within normal limits  Description: INTERVENTIONS:  - Monitor labs and rhythm and assess patient for signs and symptoms of electrolyte imbalances  - Administer electrolyte replacement as ordered  - Monitor response to electrolyte replacements, including rhythm and repeat lab results as appropriate  - Fluid restriction as ordered  - Instruct patient on fluid and nutrition restrictions as appropriate  Outcome: Not Progressing  Goal: Hemodynamic stability and optimal renal function maintained  Description: INTERVENTIONS:  - Monitor labs and assess for signs and symptoms of volume excess or deficit  - Monitor intake, output and patient weight  - Monitor urine specific gravity, serum osmolarity and serum sodium as indicated or ordered  - Monitor response to interventions for patient's volume status, including labs, urine output, blood pressure (other measures as available)  - Encourage oral intake as appropriate  - Instruct patient on fluid and nutrition restrictions as appropriate  Outcome: Not Progressing

## (undated) DEVICE — 60 ML SYRINGE REGULAR TIP: Brand: MONOJECT

## (undated) DEVICE — KIT CLEAN ENDOKIT 1.1OZ GOWNX2

## (undated) DEVICE — MEDI-VAC NON-CONDUCTIVE SUCTION TUBING 6MM X 1.8M (6FT.) L: Brand: CARDINAL HEALTH

## (undated) DEVICE — CONMED SCOPE SAVER BITE BLOCK, 20X27 MM: Brand: SCOPE SAVER

## (undated) DEVICE — KIT ENDO ORCAPOD 160/180/190

## (undated) DEVICE — FORCEPS BX L240CM DIA2.4MM L NDL RAD JAW 4

## (undated) DEVICE — Device: Brand: DUAL NARE NASAL CANNULAE FEMALE LUER CON 7FT O2 TUBE

## (undated) DEVICE — MEDI-VAC NON-CONDUCTIVE SUCTION TUBING: Brand: CARDINAL HEALTH

## (undated) DEVICE — YANKAUER SUCTION INSTRUMENT NO CONTROL VENT, BULB TIP, CLEAR: Brand: YANKAUER

## (undated) NOTE — LETTER
[address] [place] [address], [place]  Authorization for Surgical Operation and Procedure                                                                                           I hereby authorize Gianna Dinh MD, my physician and his/her assistants (if applicable), which may include medical students, residents, and/or fellows, to perform the following surgical operation/ procedure and administer such anesthesia as may be determined necessary by my physician: Operation/Procedure name (s) ESOPHAGOGASTRODUODENOSCOPY (EGD) on [address] [place] [address]   2. I recognize that during the surgical operation/procedure, unforeseen conditions may necessitate additional or different procedures than those listed above. I, therefore, further authorize and request that the above-named surgeon, assistants, or designees perform such procedures as are, in their judgment, necessary and desirable. 3.   My surgeon/physician has discussed prior to my surgery the potential benefits, risks and side effects of this procedure; the likelihood of achieving goals; and potential problems that might occur during recuperation. They also discussed reasonable alternatives to the procedure, including risks, benefits, and side effects related to the alternatives and risks related to not receiving this procedure. I have had all my questions answered and I acknowledge that no guarantee has been made as to the result that may be obtained. 4.   Should the need arise during my operation/procedure, which includes change of level of care prior to discharge, I also consent to the administration of blood and/or blood products. Further, I understand that despite careful testing and screening of blood or blood products by collecting agencies, I may still be subject to ill effects as a result of receiving a blood transfusion and/or blood products.   The following are some, but not all, of the potential risks that can occur: fever and allergic reactions, hemolytic reactions, transmission of diseases such as Hepatitis, AIDS and Cytomegalovirus (CMV) and fluid overload. In the event that I wish to have an autologous transfusion of my own blood, or a directed donor transfusion, I will discuss this with my physician. Check only if Refusing Blood or Blood Products  I understand refusal of blood or blood products as deemed necessary by my physician may have serious consequences to my condition to include possible death. I hereby assume responsibility for my refusal and release the hospital, its personnel, and my physicians from any responsibility for the consequences of my refusal.    o  Refuse   5. I authorize the use of any specimen, organs, tissues, body parts or foreign objects that may be removed from my body during the operation/procedure for diagnosis, research or teaching purposes and their subsequent disposal by hospital authorities. I also authorize the release of specimen test results and/or written reports to my treating physician on the hospital medical staff or other referring or consulting physicians involved in my care, at the discretion of the Pathologist or my treating physician. 6.   I consent to the photographing or videotaping of the operations or procedures to be performed, including appropriate portions of my body for medical, scientific, or educational purposes, provided my identity is not revealed by the pictures or by descriptive texts accompanying them. If the procedure has been photographed/videotaped, the surgeon will obtain the original picture, image, videotape or CD. The hospital will not be responsible for storage, release or maintenance of the picture, image, tape or CD.    7.   I consent to the presence of a  or observers in the operating room as deemed necessary by my physician or their designees.     8.   I recognize that in the event my procedure results in extended X-Ray/fluoroscopy time, I may develop a skin reaction. 9. If I have a Do Not Attempt Resuscitation (DNAR) order in place, that status will be suspended while in the operating room, procedural suite, and during the recovery period unless otherwise explicitly stated by me (or a person authorized to consent on my behalf). The surgeon or my attending physician will determine when the applicable recovery period ends for purposes of reinstating the DNAR order. 10. Patients having a sterilization procedure: I understand that if the procedure is successful the results will be permanent and it will therefore be impossible for me to inseminate, conceive, or bear children. I also understand that the procedure is intended to result in sterility, although the result has not been guaranteed. 11. I acknowledge that my physician has explained sedation/analgesia administration to me including the risk and benefits I consent to the administration of sedation/analgesia as may be necessary or desirable in the judgment of my physician. I CERTIFY THAT I HAVE READ AND FULLY UNDERSTAND THE ABOVE CONSENT TO OPERATION and/or OTHER PROCEDURE.     _________________________________________ _________________________________     ___________________________________  Signature of Patient     Signature of Responsible Person                   Printed Name of Responsible Person                              _________________________________________ ______________________________        ___________________________________  Signature of Witness         Date  Time         Relationship to Patient    STATEMENT OF PHYSICIAN My signature below affirms that prior to the time of the procedure; I have explained to the patient and/or his/her legal representative, the risks and benefits involved in the proposed treatment and any reasonable alternative to the proposed treatment.  I have also explained the risks and benefits involved in refusal of the proposed treatment and alternatives to the proposed treatment and have answered the patient's questions.  If I have a significant financial interest in a co-management agreement or a significant financial interest in any product or implant, or other significant relationship used in this procedure/surgery, I have disclosed this and had a discussion with my patient.     _______________________________________________________________ _____________________________  Elenora Frame of Physician)                                                                                         (Date)                                   (Time)  Patient Name: Ciaran Escalona    : 10/10/1945   Printed: 10/25/2023      Medical Record #: [id number]                                              Page 1 of 1